# Patient Record
Sex: MALE | Race: WHITE | NOT HISPANIC OR LATINO | URBAN - METROPOLITAN AREA
[De-identification: names, ages, dates, MRNs, and addresses within clinical notes are randomized per-mention and may not be internally consistent; named-entity substitution may affect disease eponyms.]

---

## 2021-04-14 ENCOUNTER — OFFICE VISIT (OUTPATIENT)
Dept: URBAN - METROPOLITAN AREA CLINIC 13 | Facility: CLINIC | Age: 46
End: 2021-04-14

## 2021-04-14 PROBLEM — 35489007 DEPRESSION: Status: ACTIVE | Noted: 2021-04-14

## 2021-04-14 PROBLEM — 69896004 RHEUMATOID ARTHRITIS: Status: ACTIVE | Noted: 2021-04-14

## 2021-04-15 ENCOUNTER — OFFICE VISIT (OUTPATIENT)
Dept: URBAN - METROPOLITAN AREA CLINIC 46 | Facility: CLINIC | Age: 46
End: 2021-04-15

## 2021-04-15 PROBLEM — 38341003 HYPERTENSION: Status: ACTIVE | Noted: 2021-04-15

## 2021-04-16 ENCOUNTER — LAB OUTSIDE AN ENCOUNTER (OUTPATIENT)
Dept: URBAN - METROPOLITAN AREA CLINIC 13 | Facility: CLINIC | Age: 46
End: 2021-04-16

## 2021-04-16 LAB
A/G RATIO: 1.8
ALBUMIN: (no result)
ALKALINE PHOSPHATASE: (no result)
ALT (SGPT): (no result)
AST (SGOT): (no result)
BASO (ABSOLUTE): (no result)
BASOS: (no result)
BILIRUBIN, TOTAL: (no result)
BUN/CREATININE RATIO: 16
BUN: (no result)
C-REACTIVE PROTEIN, QUANT: (no result)
CALCIUM: (no result)
CARBON DIOXIDE, TOTAL: (no result)
CHLORIDE: (no result)
CREATININE: (no result)
EGFR IF AFRICN AM: (no result)
EGFR IF NONAFRICN AM: (no result)
EOS (ABSOLUTE): (no result)
EOS: (no result)
GLOBULIN, TOTAL: (no result)
GLUCOSE: (no result)
HEMATOCRIT: (no result)
HEMOGLOBIN: (no result)
HEP A AB, IGM: NEGATIVE
HEP B CORE AB, TOT: NEGATIVE
HEP B SURFACE AB, QUAL: NON REACTIVE
HEP C VIRUS AB: (no result)
IMMATURE GRANS (ABS): (no result)
IMMATURE GRANULOCYTES: (no result)
LYMPHS (ABSOLUTE): (no result)
LYMPHS: (no result)
MCH: (no result)
MCHC: (no result)
MCV: (no result)
MONOCYTES(ABSOLUTE): (no result)
MONOCYTES: (no result)
NEUTROPHILS (ABSOLUTE): (no result)
NEUTROPHILS: (no result)
PLATELETS: (no result)
POTASSIUM: (no result)
PROTEIN, TOTAL: (no result)
QUANTIFERON CRITERIA: (no result)
QUANTIFERON INCUBATION: (no result)
QUANTIFERON MITOGEN VALUE: (no result)
QUANTIFERON NIL VALUE: (no result)
QUANTIFERON TB1 AG VALUE: (no result)
QUANTIFERON TB2 AG VALUE: (no result)
QUANTIFERON-TB GOLD PLUS: NEGATIVE
RBC: (no result)
RDW: (no result)
SODIUM: (no result)
WBC: (no result)

## 2021-04-17 LAB
CALPROTECTIN, STOOL - QDX: (no result)
GASTROINTESTINAL PATHOGEN: (no result)

## 2021-04-21 ENCOUNTER — OFFICE VISIT (OUTPATIENT)
Dept: URBAN - METROPOLITAN AREA SURGERY CENTER 28 | Facility: SURGERY CENTER | Age: 46
End: 2021-04-21

## 2021-04-21 PROBLEM — 396331005 CELIAC DISEASE: Status: ACTIVE | Noted: 2021-04-21

## 2021-04-21 LAB — PDFREPORT1: (no result)

## 2021-04-22 ENCOUNTER — LAB OUTSIDE AN ENCOUNTER (OUTPATIENT)
Dept: URBAN - METROPOLITAN AREA CLINIC 13 | Facility: CLINIC | Age: 46
End: 2021-04-22

## 2021-04-23 ENCOUNTER — LAB OUTSIDE AN ENCOUNTER (OUTPATIENT)
Dept: URBAN - METROPOLITAN AREA CLINIC 13 | Facility: CLINIC | Age: 46
End: 2021-04-23

## 2021-04-26 ENCOUNTER — OFFICE VISIT (OUTPATIENT)
Dept: URBAN - METROPOLITAN AREA CLINIC 13 | Facility: CLINIC | Age: 46
End: 2021-04-26

## 2021-05-17 ENCOUNTER — OFFICE VISIT (OUTPATIENT)
Dept: URBAN - METROPOLITAN AREA CLINIC 13 | Facility: CLINIC | Age: 46
End: 2021-05-17

## 2021-06-01 ENCOUNTER — OFFICE VISIT (OUTPATIENT)
Dept: URBAN - METROPOLITAN AREA CLINIC 46 | Facility: CLINIC | Age: 46
End: 2021-06-01

## 2021-06-01 PROBLEM — 428283002 HISTORY OF POLYP OF COLON: Status: ACTIVE | Noted: 2021-06-01

## 2021-06-01 PROBLEM — 398050005 DIVERTICULAR DISEASE OF COLON: Status: ACTIVE | Noted: 2021-06-01

## 2021-07-15 ENCOUNTER — OFFICE VISIT (OUTPATIENT)
Dept: URBAN - METROPOLITAN AREA CLINIC 46 | Facility: CLINIC | Age: 46
End: 2021-07-15

## 2021-07-16 ENCOUNTER — OFFICE VISIT (OUTPATIENT)
Dept: URBAN - METROPOLITAN AREA CLINIC 46 | Facility: CLINIC | Age: 46
End: 2021-07-16

## 2021-08-23 ENCOUNTER — OFFICE VISIT (OUTPATIENT)
Dept: URBAN - METROPOLITAN AREA CLINIC 46 | Facility: CLINIC | Age: 46
End: 2021-08-23

## 2021-08-28 ENCOUNTER — TELEPHONE ENCOUNTER (OUTPATIENT)
Dept: URBAN - METROPOLITAN AREA CLINIC 13 | Facility: CLINIC | Age: 46
End: 2021-08-28

## 2021-08-28 RX ORDER — MESALAMINE 1.2 G/1
TABLET, DELAYED RELEASE ORAL
OUTPATIENT
End: 2021-06-01

## 2021-08-28 RX ORDER — PREDNISONE 10 MG/1
TABLET ORAL
OUTPATIENT
End: 2021-05-17

## 2021-08-28 RX ORDER — DICYCLOMINE HYDROCHLORIDE 10 MG/1
CAPSULE ORAL
OUTPATIENT
Start: 2021-06-01 | End: 2021-08-23

## 2021-08-28 RX ORDER — TRAMADOL HYDROCHLORIDE 50 MG/1
TABLET, FILM COATED ORAL
OUTPATIENT
Start: 2021-07-07 | End: 2021-08-23

## 2021-08-28 RX ORDER — PREDNISONE 10 MG/1
TABLET ORAL
OUTPATIENT
Start: 2021-06-01 | End: 2021-08-23

## 2021-08-28 RX ORDER — BUDESONIDE 9 MG/1
TABLET, EXTENDED RELEASE ORAL
OUTPATIENT
Start: 2021-04-15 | End: 2021-05-17

## 2021-08-28 RX ORDER — MESALAMINE 4 G/60ML
ENEMA RECTAL
OUTPATIENT
Start: 2021-06-01 | End: 2021-08-23

## 2021-08-28 RX ORDER — OXYCODONE AND ACETAMINOPHEN 7.5; 325 MG/1; MG/1
TABLET ORAL
OUTPATIENT
Start: 2021-06-01 | End: 2021-08-23

## 2021-08-29 ENCOUNTER — TELEPHONE ENCOUNTER (OUTPATIENT)
Dept: URBAN - METROPOLITAN AREA CLINIC 13 | Facility: CLINIC | Age: 46
End: 2021-08-29

## 2021-08-29 RX ORDER — OXYCODONE AND ACETAMINOPHEN 7.5; 325 MG/1; MG/1
TABLET ORAL
Status: ACTIVE | COMMUNITY
Start: 2021-08-23

## 2021-09-07 ENCOUNTER — TELEPHONE ENCOUNTER (OUTPATIENT)
Dept: URBAN - METROPOLITAN AREA CLINIC 46 | Facility: CLINIC | Age: 46
End: 2021-09-07

## 2021-09-07 RX ORDER — HYOSCYAMINE SULFATE 0.12 MG/1
1 TABLET UNDER THE TONGUE AND ALLOW TO DISSOLVE  AS NEEDED TABLET, ORALLY DISINTEGRATING ORAL
Qty: 120 | Refills: 3 | OUTPATIENT
Start: 2021-09-09 | End: 2022-01-06

## 2021-09-07 RX ORDER — METRONIDAZOLE 250 MG/1
1 TABLET TABLET ORAL TWICE A DAY
Qty: 60 TABLET | Refills: 0 | OUTPATIENT
Start: 2021-09-09 | End: 2021-10-09

## 2021-10-14 ENCOUNTER — OFFICE VISIT (OUTPATIENT)
Dept: URBAN - METROPOLITAN AREA CLINIC 46 | Facility: CLINIC | Age: 46
End: 2021-10-14
Payer: COMMERCIAL

## 2021-10-14 VITALS
HEART RATE: 74 BPM | SYSTOLIC BLOOD PRESSURE: 130 MMHG | BODY MASS INDEX: 26.34 KG/M2 | WEIGHT: 184 LBS | TEMPERATURE: 98.7 F | HEIGHT: 70 IN | DIASTOLIC BLOOD PRESSURE: 79 MMHG

## 2021-10-14 DIAGNOSIS — K51.011 ULCERATIVE PANCOLITIS WITH RECTAL BLEEDING: ICD-10-CM

## 2021-10-14 PROCEDURE — 99214 OFFICE O/P EST MOD 30 MIN: CPT | Performed by: INTERNAL MEDICINE

## 2021-10-14 RX ORDER — DIPHENOXYLATE HYDROCHLORIDE AND ATROPINE SULFATE 2.5; .025 MG/1; MG/1
1 TABLET AS NEEDED TABLET ORAL
Qty: 120 TABLET | Refills: 3 | OUTPATIENT
Start: 2021-10-14 | End: 2022-02-10

## 2021-10-14 RX ORDER — NORTRIPTYLINE HYDROCHLORIDE 25 MG/1
1 CAPSULE CAPSULE ORAL
Qty: 30 | Refills: 3 | OUTPATIENT

## 2021-10-14 RX ORDER — HYOSCYAMINE SULFATE 0.12 MG/1
1 TABLET UNDER THE TONGUE AND ALLOW TO DISSOLVE  AS NEEDED TABLET, ORALLY DISINTEGRATING ORAL
Qty: 120 | Refills: 3 | Status: ACTIVE | COMMUNITY
Start: 2021-09-09 | End: 2022-01-06

## 2021-10-14 RX ORDER — OXYCODONE AND ACETAMINOPHEN 7.5; 325 MG/1; MG/1
TABLET ORAL
Status: ACTIVE | COMMUNITY
Start: 2021-08-23

## 2021-10-14 RX ORDER — OXYCODONE AND ACETAMINOPHEN 7.5; 325 MG/1; MG/1
TABLET ORAL
Start: 2021-08-23

## 2021-10-14 RX ORDER — HYOSCYAMINE SULFATE 0.12 MG/1
1 TABLET UNDER THE TONGUE AND ALLOW TO DISSOLVE  AS NEEDED TABLET, ORALLY DISINTEGRATING ORAL
OUTPATIENT
Start: 2021-09-09 | End: 2022-01-06

## 2021-10-14 NOTE — HPI-TODAY'S VISIT:
Pt dx with left sided Ulcerative Colitis in [place] in 7/2019; failed prednisone and presented 4/2021 for a second opinion. Restaging colonoscopy revealed severe pan colitis with more severe disease in the distal 35cm of the colon. Pt since has started Humira 80mg every 2 weeks in 6/2021 and was able to taper Prednisone on last visit 8/23/2021 down to 2 BM per day but has had severe spasm causing  disability due to symptoms requiring Tramadol, Percocet, Anti-spasmodics. Labs 9/8/2021 with WBC 10; Hgb 10.3 and MCV 72; CMP wnl; CRP 12. Pt has since called the office 9/7/2021 with severe spasm and no relief with Bentyl and Donnatol not availabe and asking for "another opinion"; advised to try Levsin and start Flagyl 250mg BID for 4 weeks. Pt now presents for a follow up. States overall better but not symptom free. 10-12 BM per day and "violent" spasm but has moved from lower abdomen to just the rectum; formed to mucus and small volume; no bleeding. Levsin QID and no relief or difference from Bentyl. Taking 1 percocet twice a day. Flagyl for the past 4 weeks and no relief. Weight stable and no drop. No EIM's of IBD. Able to work.

## 2021-11-30 ENCOUNTER — TELEPHONE ENCOUNTER (OUTPATIENT)
Dept: URBAN - METROPOLITAN AREA CLINIC 46 | Facility: CLINIC | Age: 46
End: 2021-11-30

## 2021-12-09 ENCOUNTER — OFFICE VISIT (OUTPATIENT)
Dept: URBAN - METROPOLITAN AREA CLINIC 46 | Facility: CLINIC | Age: 46
End: 2021-12-09

## 2021-12-30 ENCOUNTER — OFFICE VISIT (OUTPATIENT)
Dept: URBAN - METROPOLITAN AREA CLINIC 46 | Facility: CLINIC | Age: 46
End: 2021-12-30
Payer: COMMERCIAL

## 2021-12-30 VITALS
SYSTOLIC BLOOD PRESSURE: 146 MMHG | DIASTOLIC BLOOD PRESSURE: 88 MMHG | HEIGHT: 70 IN | HEART RATE: 67 BPM | BODY MASS INDEX: 27.32 KG/M2 | TEMPERATURE: 98.7 F | WEIGHT: 190.8 LBS

## 2021-12-30 DIAGNOSIS — K51.011 ULCERATIVE PANCOLITIS WITH RECTAL BLEEDING: ICD-10-CM

## 2021-12-30 PROCEDURE — 99213 OFFICE O/P EST LOW 20 MIN: CPT | Performed by: INTERNAL MEDICINE

## 2021-12-30 RX ORDER — DIPHENOXYLATE HYDROCHLORIDE AND ATROPINE SULFATE 2.5; .025 MG/1; MG/1
1 TABLET AS NEEDED TABLET ORAL
Qty: 120 TABLET | Refills: 3 | Status: ACTIVE | COMMUNITY
Start: 2021-10-14 | End: 2022-02-10

## 2021-12-30 RX ORDER — OXYCODONE AND ACETAMINOPHEN 7.5; 325 MG/1; MG/1
TABLET ORAL
Status: ACTIVE | COMMUNITY
Start: 2021-08-23

## 2021-12-30 RX ORDER — NORTRIPTYLINE HYDROCHLORIDE 25 MG/1
1 CAPSULE CAPSULE ORAL
Qty: 30 | Refills: 3 | Status: DISCONTINUED | COMMUNITY

## 2021-12-30 RX ORDER — OXYCODONE AND ACETAMINOPHEN 7.5; 325 MG/1; MG/1
TABLET ORAL
OUTPATIENT
Start: 2021-08-23

## 2021-12-30 RX ORDER — DIPHENOXYLATE HYDROCHLORIDE AND ATROPINE SULFATE 2.5; .025 MG/1; MG/1
1 TABLET AS NEEDED TABLET ORAL
OUTPATIENT
Start: 2021-10-14

## 2021-12-30 NOTE — HPI-TODAY'S VISIT:
Pt dx with left sided Ulcerative Colitis in [place] in 7/2019; failed prednisone and presented 4/2021 for a second opinion. Restaging colonoscopy revealed severe pan colitis with more severe disease in the distal 35cm of the colon. Pt since has started Humira 80mg every 2 weeks in 6/2021 and was able to taper Prednisone but has had severe spasm causing  disability due to symptoms requiring Tramadol, Percocet, Anti-spasmodics. Labs 9/8/2021 with WBC 10; Hgb 10.3 and MCV 72; CMP wnl; CRP 12.   Seen for a follow up 10/2021 and  better but not symptom free; 10-12 BM per day and still with "violent" spasm but has moved from lower abdomen to just the rectum; formed to mucus and small volume; no bleeding. Levsin QID and no relief or difference from Bentyl; only relief is with 1 percocet twice a day. Lomotil added and Elavil (not filled)  Pt has since called back for refills on Percocet. Now presents for a follow up. States continues to be stable and not getting worse but still with 10 small volume BM per day with nocturnal symptoms; no bleeding. Rectal spasm still can be severe. Taking Lomotil QID and Apriso once a day. Still on Humira 80mg every other week. Has weaned down on Percocet and only taking every 2-3 days. Still with fatigue at times. No joint pain or swelling. No weight loss.

## 2022-01-26 ENCOUNTER — TELEPHONE ENCOUNTER (OUTPATIENT)
Dept: URBAN - METROPOLITAN AREA CLINIC 46 | Facility: CLINIC | Age: 47
End: 2022-01-26

## 2022-01-28 ENCOUNTER — OFFICE VISIT (OUTPATIENT)
Dept: URBAN - METROPOLITAN AREA SURGERY CENTER 28 | Facility: SURGERY CENTER | Age: 47
End: 2022-01-28
Payer: COMMERCIAL

## 2022-01-28 DIAGNOSIS — K64.0 BLEEDING GRADE I HEMORRHOIDS: ICD-10-CM

## 2022-01-28 DIAGNOSIS — K52.89 (LYMPHOCYTIC) MICROSCOPIC COLITIS: ICD-10-CM

## 2022-01-28 DIAGNOSIS — K51.50 CHRONIC LEFT-SIDED ULCERATIVE COLITIS: ICD-10-CM

## 2022-01-28 PROCEDURE — 45380 COLONOSCOPY AND BIOPSY: CPT | Performed by: INTERNAL MEDICINE

## 2022-01-28 PROCEDURE — G8907 PT DOC NO EVENTS ON DISCHARG: HCPCS | Performed by: INTERNAL MEDICINE

## 2022-01-28 RX ORDER — OXYCODONE AND ACETAMINOPHEN 7.5; 325 MG/1; MG/1
TABLET ORAL
Status: ACTIVE | COMMUNITY
Start: 2021-08-23

## 2022-01-28 RX ORDER — DIPHENOXYLATE HYDROCHLORIDE AND ATROPINE SULFATE 2.5; .025 MG/1; MG/1
1 TABLET AS NEEDED TABLET ORAL
Status: ACTIVE | COMMUNITY
Start: 2021-10-14

## 2022-02-21 ENCOUNTER — TELEPHONE ENCOUNTER (OUTPATIENT)
Dept: URBAN - METROPOLITAN AREA CLINIC 46 | Facility: CLINIC | Age: 47
End: 2022-02-21

## 2022-02-21 RX ORDER — USTEKINUMAB 90 MG/ML
AS DIRECTED INJECTION, SOLUTION SUBCUTANEOUS
Qty: 90 MILLIGRAMS | Refills: 0 | OUTPATIENT
Start: 2022-02-25 | End: 2022-05-26

## 2022-02-21 RX ORDER — USTEKINUMAB 130 MG/26ML
AS DIRECTED SOLUTION INTRAVENOUS ONCE
Qty: 390 MILLIGRAMS | Refills: 0 | OUTPATIENT
Start: 2022-02-25 | End: 2022-02-26

## 2022-03-07 ENCOUNTER — OFFICE VISIT (OUTPATIENT)
Dept: URBAN - METROPOLITAN AREA CLINIC 48 | Facility: CLINIC | Age: 47
End: 2022-03-07

## 2022-03-11 ENCOUNTER — WEB ENCOUNTER (OUTPATIENT)
Dept: URBAN - METROPOLITAN AREA CLINIC 46 | Facility: CLINIC | Age: 47
End: 2022-03-11

## 2022-03-29 ENCOUNTER — OFFICE VISIT (OUTPATIENT)
Dept: URBAN - METROPOLITAN AREA CLINIC 43 | Facility: CLINIC | Age: 47
End: 2022-03-29
Payer: COMMERCIAL

## 2022-03-29 ENCOUNTER — TELEPHONE ENCOUNTER (OUTPATIENT)
Dept: URBAN - METROPOLITAN AREA CLINIC 43 | Facility: CLINIC | Age: 47
End: 2022-03-29

## 2022-03-29 VITALS
SYSTOLIC BLOOD PRESSURE: 136 MMHG | DIASTOLIC BLOOD PRESSURE: 94 MMHG | TEMPERATURE: 97 F | RESPIRATION RATE: 18 BRPM | WEIGHT: 197.6 LBS | HEIGHT: 70 IN | HEART RATE: 75 BPM | BODY MASS INDEX: 28.29 KG/M2

## 2022-03-29 DIAGNOSIS — K51.80 CHRONIC PANCOLONIC ULCERATIVE COLITIS: ICD-10-CM

## 2022-03-29 PROCEDURE — 96413 CHEMO IV INFUSION 1 HR: CPT | Performed by: INTERNAL MEDICINE

## 2022-03-29 RX ORDER — USTEKINUMAB 90 MG/ML
AS DIRECTED INJECTION, SOLUTION SUBCUTANEOUS
Qty: 90 MILLIGRAMS | Refills: 0 | Status: ACTIVE | COMMUNITY
Start: 2022-02-25 | End: 2022-05-26

## 2022-03-29 RX ORDER — DIPHENOXYLATE HYDROCHLORIDE AND ATROPINE SULFATE 2.5; .025 MG/1; MG/1
1 TABLET AS NEEDED TABLET ORAL
Status: ACTIVE | COMMUNITY
Start: 2021-10-14

## 2022-03-29 RX ORDER — OXYCODONE AND ACETAMINOPHEN 7.5; 325 MG/1; MG/1
TABLET ORAL
Status: ACTIVE | COMMUNITY
Start: 2021-08-23

## 2022-04-04 ENCOUNTER — WEB ENCOUNTER (OUTPATIENT)
Dept: URBAN - METROPOLITAN AREA CLINIC 46 | Facility: CLINIC | Age: 47
End: 2022-04-04

## 2022-04-29 ENCOUNTER — TELEPHONE ENCOUNTER (OUTPATIENT)
Dept: URBAN - METROPOLITAN AREA CLINIC 46 | Facility: CLINIC | Age: 47
End: 2022-04-29

## 2022-08-12 ENCOUNTER — TELEPHONE ENCOUNTER (OUTPATIENT)
Dept: URBAN - METROPOLITAN AREA CLINIC 46 | Facility: CLINIC | Age: 47
End: 2022-08-12

## 2022-10-11 ENCOUNTER — ERX REFILL RESPONSE (OUTPATIENT)
Dept: URBAN - METROPOLITAN AREA CLINIC 44 | Facility: CLINIC | Age: 47
End: 2022-10-11

## 2022-10-11 RX ORDER — MESALAMINE 0.38 G/1
TAKE 4 CAPSULES ONCE A DAY AS DIRECTED CAPSULE, EXTENDED RELEASE ORAL
Qty: 360 CAPSULE | Refills: 3 | OUTPATIENT

## 2022-11-17 ENCOUNTER — TELEPHONE ENCOUNTER (OUTPATIENT)
Dept: URBAN - METROPOLITAN AREA CLINIC 46 | Facility: CLINIC | Age: 47
End: 2022-11-17

## 2022-12-27 ENCOUNTER — OFFICE VISIT (OUTPATIENT)
Dept: URBAN - METROPOLITAN AREA CLINIC 44 | Facility: CLINIC | Age: 47
End: 2022-12-27
Payer: COMMERCIAL

## 2022-12-27 VITALS
WEIGHT: 218.6 LBS | SYSTOLIC BLOOD PRESSURE: 139 MMHG | HEART RATE: 80 BPM | BODY MASS INDEX: 31.3 KG/M2 | HEIGHT: 70 IN | DIASTOLIC BLOOD PRESSURE: 97 MMHG | TEMPERATURE: 99.7 F

## 2022-12-27 DIAGNOSIS — K51.90 ULCERATIVE COLITIS: ICD-10-CM

## 2022-12-27 PROCEDURE — 99214 OFFICE O/P EST MOD 30 MIN: CPT | Performed by: INTERNAL MEDICINE

## 2022-12-27 RX ORDER — USTEKINUMAB 90 MG/ML
INJECTION, SOLUTION SUBCUTANEOUS
Qty: 1 | Refills: 3 | Status: ACTIVE | COMMUNITY

## 2022-12-27 RX ORDER — MESALAMINE 0.38 G/1
TAKE 4 CAPSULES ONCE A DAY AS DIRECTED CAPSULE, EXTENDED RELEASE ORAL
OUTPATIENT

## 2022-12-27 RX ORDER — MESALAMINE 0.38 G/1
TAKE 4 CAPSULES ONCE A DAY AS DIRECTED CAPSULE, EXTENDED RELEASE ORAL
Qty: 360 CAPSULE | Refills: 3 | Status: ACTIVE | COMMUNITY

## 2022-12-27 RX ORDER — USTEKINUMAB 90 MG/ML
INJECTION, SOLUTION SUBCUTANEOUS
OUTPATIENT

## 2022-12-27 NOTE — HPI-TODAY'S VISIT:
Pt dx with left sided Ulcerative Colitis in [place] in 7/2019; failed prednisone and presented 4/2021 for a second opinion. Restaging colonoscopy revealed severe pan colitis with more severe disease in the distal 35cm of the colon. Pt since has started Humira 80mg every 2 weeks in 6/2021 and was able to taper Prednisone but has had severe spasm causing  disability due to symptoms requiring Tramadol, Percocet, Anti-spasmodics. Labs 9/8/2021 with WBC 10; Hgb 10.3 and MCV 72; CMP wnl; CRP 12.   Seen for a follow up 10/2021 and  better but not symptom free; 10-12 BM per day and still with "violent" spasm but has moved from lower abdomen to just the rectum; formed to mucus and small volume; no bleeding. Levsin QID and no relief or difference from Bentyl; only relief is with 1 percocet twice a day. Lomotil added and Elavil (not filled)  12/2021:. Now presents for a follow up. States continues to be stable and not getting worse but still with 10 small volume BM per day with nocturnal symptoms; no bleeding. Rectal spasm still can be severe. Taking Lomotil QID and Apriso once a day. Still on Humira 80mg every other week. Has weaned down on Percocet and only taking every 2-3 days. Still with fatigue at times. No joint pain or swelling. No weight loss.  1/28/2022; restaging colonoscopy with severe disease in distal 25cm of colon. Stelara induction started 3/2022 and on 90mg every 8 weeks since.   12/27/2022: Now for a follow up. Last 90 Percocet Rx 9/29/2022. States feels better then has in years on the Stelara. Also taking Apriso 3-4 Clustered BM in AM that are formed then 1-2 rest of day. Last time saw blood was 10/2022. Not needed pain meds since 10/2022. No EIMS of IBD. No recent labs

## 2023-02-27 ENCOUNTER — WEB ENCOUNTER (OUTPATIENT)
Dept: URBAN - METROPOLITAN AREA CLINIC 46 | Facility: CLINIC | Age: 48
End: 2023-02-27

## 2023-04-07 ENCOUNTER — TELEPHONE ENCOUNTER (OUTPATIENT)
Dept: URBAN - METROPOLITAN AREA CLINIC 46 | Facility: CLINIC | Age: 48
End: 2023-04-07

## 2023-04-07 RX ORDER — USTEKINUMAB 90 MG/ML
AS DIRECTED INJECTION, SOLUTION SUBCUTANEOUS
Qty: 1 KIT | Refills: 5

## 2023-05-15 ENCOUNTER — TELEPHONE ENCOUNTER (OUTPATIENT)
Dept: URBAN - METROPOLITAN AREA CLINIC 46 | Facility: CLINIC | Age: 48
End: 2023-05-15

## 2023-05-15 RX ORDER — OXYCODONE AND ACETAMINOPHEN 7.5; 325 MG/1; MG/1
TABLET ORAL
Start: 2021-08-23

## 2023-05-26 ENCOUNTER — OFFICE VISIT (OUTPATIENT)
Dept: URBAN - METROPOLITAN AREA CLINIC 46 | Facility: CLINIC | Age: 48
End: 2023-05-26
Payer: COMMERCIAL

## 2023-05-26 VITALS
BODY MASS INDEX: 30.92 KG/M2 | HEIGHT: 70 IN | WEIGHT: 216 LBS | SYSTOLIC BLOOD PRESSURE: 161 MMHG | HEART RATE: 67 BPM | TEMPERATURE: 98.8 F | DIASTOLIC BLOOD PRESSURE: 93 MMHG

## 2023-05-26 DIAGNOSIS — K51.011 ULCERATIVE PANCOLITIS WITH RECTAL BLEEDING: ICD-10-CM

## 2023-05-26 PROCEDURE — 99214 OFFICE O/P EST MOD 30 MIN: CPT | Performed by: INTERNAL MEDICINE

## 2023-05-26 RX ORDER — OXYCODONE AND ACETAMINOPHEN 7.5; 325 MG/1; MG/1: TABLET ORAL

## 2023-05-26 RX ORDER — USTEKINUMAB 90 MG/ML
AS DIRECTED INJECTION, SOLUTION SUBCUTANEOUS
Qty: 1 KIT | Refills: 5 | Status: ACTIVE | COMMUNITY

## 2023-05-26 RX ORDER — TADALAFIL 5 MG/1
1 TABLET AS NEEDED TABLET ORAL ONCE A DAY
Qty: 90 TABLET | Status: ACTIVE | COMMUNITY

## 2023-05-26 RX ORDER — USTEKINUMAB 90 MG/ML
AS DIRECTED INJECTION, SOLUTION SUBCUTANEOUS
OUTPATIENT

## 2023-05-26 RX ORDER — BUDESONIDE 9 MG/1
1 TABLET IN THE MORNING TABLET, FILM COATED, EXTENDED RELEASE ORAL ONCE A DAY
Qty: 30 TABLET | Refills: 1 | OUTPATIENT
Start: 2023-05-26 | End: 2023-07-25

## 2023-05-26 NOTE — HPI-TODAY'S VISIT:
Pt dx with left sided Ulcerative Colitis in [place] in 7/2019; failed prednisone and presented 4/2021 for a second opinion. Restaging colonoscopy revealed severe pan colitis with more severe disease in the distal 35cm of the colon. Pt since has started Humira 80mg every 2 weeks in 6/2021 and was able to taper Prednisone but has had severe spasm causing  disability due to symptoms requiring Tramadol, Percocet, Anti-spasmodics. Labs 9/8/2021 with WBC 10; Hgb 10.3 and MCV 72; CMP wnl; CRP 12.   Seen for a follow up 10/2021 and  better but not symptom free; 10-12 BM per day and still with "violent" spasm but has moved from lower abdomen to just the rectum; formed to mucus and small volume; no bleeding. Levsin QID and no relief or difference from Bentyl; only relief is with 1 percocet twice a day. Lomotil added and Elavil (not filled)  12/2021:. Now presents for a follow up. States continues to be stable and not getting worse but still with 10 small volume BM per day with nocturnal symptoms; no bleeding. Rectal spasm still can be severe. Taking Lomotil QID and Apriso once a day. Still on Humira 80mg every other week. Has weaned down on Percocet and only taking every 2-3 days. Still with fatigue at times. No joint pain or swelling. No weight loss.  1/28/2022; restaging colonoscopy with severe disease in distal 25cm of colon. Stelara induction started 3/2022 and on 90mg every 8 weeks since.   12/27/2022: Now for a follow up. Last 90 Percocet Rx 9/29/2022. States feels better then has in years on the Stelara. Also taking Apriso 3-4 Clustered BM in AM that are formed then 1-2 rest of day. Last time saw blood was 10/2022. Not needed pain meds since 10/2022. No EIMS of IBD. No recent labs.  Plan was to continue Stelara 90mg every 8 weeks. CBC, CMP, CRP 1/2023 checked and all wnl, QTG negative   5/25/2023: For a follow up. Was doing well until 3/2023 and has been in mild flare: 4-6 BM with ugency and bleeding. Mild faitgue. Feels like stable and not worsening in the past 2 weeks. Still on Stelara 90mg every 8 weeks and last dose 4 weeks ago.

## 2023-05-31 ENCOUNTER — LAB OUTSIDE AN ENCOUNTER (OUTPATIENT)
Dept: URBAN - METROPOLITAN AREA CLINIC 44 | Facility: CLINIC | Age: 48
End: 2023-05-31

## 2023-06-07 ENCOUNTER — WEB ENCOUNTER (OUTPATIENT)
Dept: URBAN - METROPOLITAN AREA CLINIC 44 | Facility: CLINIC | Age: 48
End: 2023-06-07

## 2023-06-07 LAB
ADENOVIRUS F 40/41: NOT DETECTED
CALPROTECTIN, STOOL - QDX: (no result)
CAMPYLOBACTER: NOT DETECTED
CLOSTRIDIUM DIFFICILE: NOT DETECTED
CRYPTOSPORIDIUM: NOT DETECTED
ENTAMOEBA HISTOLYTICA: NOT DETECTED
ENTEROAGGREGATIVE E.COLI: NOT DETECTED
ENTEROTOXIGENIC E.COLI: NOT DETECTED
ESCHERICHIA COLI O157: NOT DETECTED
GIARDIA LAMBLIA: NOT DETECTED
NOROVIRUS GI/GII: NOT DETECTED
ROTAVIRUS A: NOT DETECTED
SALMONELLA SPP.: NOT DETECTED
SHIGA-LIKE TOXIN PRODUCING E.COLI: NOT DETECTED
SHIGELLA SPP. / ENTEROINVASIVE E.COLI: NOT DETECTED
VIBRIO PARAHAEMOLYTICUS: NOT DETECTED
VIBRIO SPP.: NOT DETECTED
YERSINIA ENTEROCOLITICA: NOT DETECTED

## 2023-06-22 ENCOUNTER — ERX REFILL RESPONSE (OUTPATIENT)
Dept: URBAN - METROPOLITAN AREA CLINIC 46 | Facility: CLINIC | Age: 48
End: 2023-06-22

## 2023-06-22 RX ORDER — BUDESONIDE 9 MG/1
TAKE 1 TABLET BY MOUTH EVERY DAY IN THE MORNING FOR 30 DAYS TABLET, FILM COATED, EXTENDED RELEASE ORAL
Qty: 30 TABLET | Refills: 1 | OUTPATIENT

## 2023-06-22 RX ORDER — BUDESONIDE 9 MG/1
1 TABLET IN THE MORNING TABLET, FILM COATED, EXTENDED RELEASE ORAL ONCE A DAY
Qty: 30 TABLET | Refills: 1 | OUTPATIENT

## 2023-06-23 ENCOUNTER — OFFICE VISIT (OUTPATIENT)
Dept: URBAN - METROPOLITAN AREA CLINIC 46 | Facility: CLINIC | Age: 48
End: 2023-06-23

## 2023-06-26 ENCOUNTER — TELEPHONE ENCOUNTER (OUTPATIENT)
Dept: URBAN - METROPOLITAN AREA CLINIC 46 | Facility: CLINIC | Age: 48
End: 2023-06-26

## 2023-06-26 RX ORDER — USTEKINUMAB 90 MG/ML
AS DIRECTED INJECTION, SOLUTION SUBCUTANEOUS
Status: ACTIVE | COMMUNITY

## 2023-06-26 RX ORDER — BUDESONIDE 9 MG/1
TAKE 1 TABLET BY MOUTH EVERY DAY IN THE MORNING FOR 30 DAYS TABLET, FILM COATED, EXTENDED RELEASE ORAL
Qty: 30 TABLET | Refills: 1 | Status: ACTIVE | COMMUNITY

## 2023-06-26 RX ORDER — BUDESONIDE 9 MG/1
TAKE 1 TABLET BY MOUTH EVERY DAY IN THE MORNING FOR 30 DAYS TABLET, FILM COATED, EXTENDED RELEASE ORAL
OUTPATIENT

## 2023-06-26 RX ORDER — OXYCODONE HYDROCHLORIDE AND ACETAMINOPHEN 7.5; 325 MG/1; MG/1
1 TABLET AS NEEDED TABLET ORAL
Qty: 60 TABLET | Refills: 0 | OUTPATIENT
Start: 2023-06-26 | End: 2023-07-26

## 2023-06-26 RX ORDER — TADALAFIL 5 MG/1
1 TABLET AS NEEDED TABLET ORAL ONCE A DAY
Qty: 90 TABLET | Status: ACTIVE | COMMUNITY

## 2023-06-26 RX ORDER — OXYCODONE AND ACETAMINOPHEN 7.5; 325 MG/1; MG/1
TABLET ORAL
Status: ACTIVE | COMMUNITY

## 2023-06-26 RX ORDER — PREDNISONE 20 MG/1
1 TABLET TABLET ORAL ONCE A DAY
Qty: 30 | Refills: 3 | OUTPATIENT
Start: 2023-06-26 | End: 2023-10-24

## 2023-06-30 ENCOUNTER — TELEPHONE ENCOUNTER (OUTPATIENT)
Dept: URBAN - METROPOLITAN AREA CLINIC 46 | Facility: CLINIC | Age: 48
End: 2023-06-30

## 2023-06-30 RX ORDER — OXYCODONE HYDROCHLORIDE AND ACETAMINOPHEN 7.5; 325 MG/1; MG/1
1 TABLET AS NEEDED TABLET ORAL
Qty: 60 TABLET | Refills: 0 | COMMUNITY
Start: 2023-06-26 | End: 2023-07-26

## 2023-06-30 RX ORDER — PREDNISONE 20 MG/1
1 TABLET TABLET ORAL ONCE A DAY
Qty: 30 | Refills: 3
Start: 2023-06-26 | End: 2023-10-28

## 2023-06-30 RX ORDER — USTEKINUMAB 90 MG/ML
AS DIRECTED INJECTION, SOLUTION SUBCUTANEOUS
COMMUNITY

## 2023-06-30 RX ORDER — TADALAFIL 5 MG/1
1 TABLET AS NEEDED TABLET ORAL ONCE A DAY
Qty: 90 TABLET | COMMUNITY

## 2023-06-30 RX ORDER — PREDNISONE 20 MG/1
1 TABLET TABLET ORAL ONCE A DAY
Qty: 30 | Refills: 3 | COMMUNITY
Start: 2023-06-26 | End: 2023-10-24

## 2023-06-30 RX ORDER — OXYCODONE AND ACETAMINOPHEN 7.5; 325 MG/1; MG/1
TABLET ORAL
COMMUNITY

## 2023-07-18 ENCOUNTER — TELEPHONE ENCOUNTER (OUTPATIENT)
Dept: URBAN - METROPOLITAN AREA CLINIC 46 | Facility: CLINIC | Age: 48
End: 2023-07-18

## 2023-07-20 ENCOUNTER — TELEPHONE ENCOUNTER (OUTPATIENT)
Dept: URBAN - METROPOLITAN AREA CLINIC 46 | Facility: CLINIC | Age: 48
End: 2023-07-20

## 2023-08-04 ENCOUNTER — OFFICE VISIT (OUTPATIENT)
Dept: URBAN - METROPOLITAN AREA CLINIC 46 | Facility: CLINIC | Age: 48
End: 2023-08-04

## 2023-08-07 ENCOUNTER — TELEPHONE ENCOUNTER (OUTPATIENT)
Dept: URBAN - METROPOLITAN AREA CLINIC 44 | Facility: CLINIC | Age: 48
End: 2023-08-07

## 2023-08-07 ENCOUNTER — OFFICE VISIT (OUTPATIENT)
Dept: URBAN - METROPOLITAN AREA SURGERY CENTER 27 | Facility: SURGERY CENTER | Age: 48
End: 2023-08-07
Payer: COMMERCIAL

## 2023-08-07 ENCOUNTER — CLAIMS CREATED FROM THE CLAIM WINDOW (OUTPATIENT)
Dept: URBAN - METROPOLITAN AREA CLINIC 4 | Facility: CLINIC | Age: 48
End: 2023-08-07
Payer: COMMERCIAL

## 2023-08-07 DIAGNOSIS — K51.00 ULCERATIVE CHRONIC PANCOLITIS: ICD-10-CM

## 2023-08-07 DIAGNOSIS — K51.80 CHRONIC PANCOLONIC ULCERATIVE COLITIS: ICD-10-CM

## 2023-08-07 DIAGNOSIS — K64.1 INTERNAL HEMORRHOIDS, GRADE II: ICD-10-CM

## 2023-08-07 DIAGNOSIS — K63.89 OTHER SPECIFIED DISEASES OF INTESTINE: ICD-10-CM

## 2023-08-07 DIAGNOSIS — K63.89 APPENDICITIS EPIPLOICA: ICD-10-CM

## 2023-08-07 DIAGNOSIS — K57.30 DIVERTICULA OF COLON: ICD-10-CM

## 2023-08-07 DIAGNOSIS — K51.919 ULCERATIVE COLITIS, UNSPECIFIED WITH UNSPECIFIED COMPLICATIONS: ICD-10-CM

## 2023-08-07 PROCEDURE — 45380 COLONOSCOPY AND BIOPSY: CPT | Performed by: INTERNAL MEDICINE

## 2023-08-07 PROCEDURE — 88305 TISSUE EXAM BY PATHOLOGIST: CPT | Performed by: PATHOLOGY

## 2023-08-07 PROCEDURE — 88342 IMHCHEM/IMCYTCHM 1ST ANTB: CPT | Performed by: PATHOLOGY

## 2023-08-07 PROCEDURE — G8907 PT DOC NO EVENTS ON DISCHARG: HCPCS | Performed by: INTERNAL MEDICINE

## 2023-08-07 PROCEDURE — 00811 ANES LWR INTST NDSC NOS: CPT | Performed by: ANESTHESIOLOGY

## 2023-08-07 RX ORDER — TADALAFIL 5 MG/1
1 TABLET AS NEEDED TABLET ORAL ONCE A DAY
Qty: 90 TABLET | COMMUNITY

## 2023-08-07 RX ORDER — UPADACITINIB 30 MG/1
1 TABLET TABLET, EXTENDED RELEASE ORAL ONCE A DAY
Qty: 30 | Refills: 5 | OUTPATIENT
Start: 2023-08-07 | End: 2024-02-02

## 2023-08-07 RX ORDER — UPADACITINIB 45 MG/1
1 TABLET TABLET, EXTENDED RELEASE ORAL ONCE DAILY
Qty: 28 | Refills: 1 | OUTPATIENT
Start: 2023-08-07 | End: 2023-10-02

## 2023-08-07 RX ORDER — USTEKINUMAB 90 MG/ML
AS DIRECTED INJECTION, SOLUTION SUBCUTANEOUS
COMMUNITY

## 2023-08-07 RX ORDER — PREDNISONE 20 MG/1
1 TABLET TABLET ORAL ONCE A DAY
Qty: 30 | Refills: 3 | Status: ACTIVE | COMMUNITY
Start: 2023-06-26 | End: 2023-10-28

## 2023-08-07 RX ORDER — OXYCODONE AND ACETAMINOPHEN 7.5; 325 MG/1; MG/1
TABLET ORAL
COMMUNITY

## 2023-08-11 ENCOUNTER — TELEPHONE ENCOUNTER (OUTPATIENT)
Dept: URBAN - METROPOLITAN AREA CLINIC 46 | Facility: CLINIC | Age: 48
End: 2023-08-11

## 2023-09-26 ENCOUNTER — TELEPHONE ENCOUNTER (OUTPATIENT)
Dept: URBAN - METROPOLITAN AREA CLINIC 46 | Facility: CLINIC | Age: 48
End: 2023-09-26

## 2023-09-26 RX ORDER — UPADACITINIB 30 MG/1
1 TABLET TABLET, EXTENDED RELEASE ORAL ONCE A DAY
Qty: 30 | Refills: 5
Start: 2023-08-07 | End: 2024-03-24

## 2023-10-11 ENCOUNTER — OFFICE VISIT (OUTPATIENT)
Dept: URBAN - NONMETROPOLITAN AREA CLINIC 9 | Facility: CLINIC | Age: 48
End: 2023-10-11
Payer: COMMERCIAL

## 2023-10-11 VITALS
HEART RATE: 85 BPM | SYSTOLIC BLOOD PRESSURE: 132 MMHG | WEIGHT: 219.4 LBS | BODY MASS INDEX: 31.41 KG/M2 | HEIGHT: 70 IN | OXYGEN SATURATION: 97 % | DIASTOLIC BLOOD PRESSURE: 89 MMHG

## 2023-10-11 DIAGNOSIS — K51.011 ULCERATIVE PANCOLITIS WITH RECTAL BLEEDING: ICD-10-CM

## 2023-10-11 PROBLEM — 444548001: Status: ACTIVE | Noted: 2021-10-14

## 2023-10-11 PROBLEM — 64766004 ULCERATIVE COLITIS: Status: ACTIVE | Noted: 2022-03-01

## 2023-10-11 PROCEDURE — 99214 OFFICE O/P EST MOD 30 MIN: CPT | Performed by: INTERNAL MEDICINE

## 2023-10-11 RX ORDER — UPADACITINIB 30 MG/1
1 TABLET TABLET, EXTENDED RELEASE ORAL ONCE A DAY
Qty: 30 | Refills: 5 | Status: ACTIVE | COMMUNITY
Start: 2023-08-07 | End: 2024-03-24

## 2023-10-11 RX ORDER — UPADACITINIB 30 MG/1
1 TABLET TABLET, EXTENDED RELEASE ORAL ONCE A DAY
OUTPATIENT
Start: 2023-08-07

## 2023-10-11 RX ORDER — OXYCODONE HYDROCHLORIDE AND ACETAMINOPHEN 7.5; 325 MG/1; MG/1
1 TABLET AS NEEDED TABLET ORAL
Qty: 40 TABLET | Refills: 0

## 2023-10-11 NOTE — HPI-TODAY'S VISIT:
Pt dx with left sided Ulcerative Colitis in [place] in 7/2019; failed prednisone and presented 4/2021 for a second opinion. Restaging colonoscopy revealed severe pan colitis with more severe disease in the distal 35cm of the colon. Pt since has started Humira 80mg every 2 weeks in 6/2021 and was able to taper Prednisone but has had severe spasm causing  disability due to symptoms requiring Tramadol, Percocet, Anti-spasmodics. Labs 9/8/2021 with WBC 10; Hgb 10.3 and MCV 72; CMP wnl; CRP 12.   Seen for a follow up 10/2021 and  better but not symptom free; 10-12 BM per day and still with "violent" spasm but has moved from lower abdomen to just the rectum; formed to mucus and small volume; no bleeding. Levsin QID and no relief or difference from Bentyl; only relief is with 1 percocet twice a day. Lomotil added and Elavil (not filled)  12/2021:. Now presents for a follow up. States continues to be stable and not getting worse but still with 10 small volume BM per day with nocturnal symptoms; no bleeding. Rectal spasm still can be severe. Taking Lomotil QID and Apriso once a day. Still on Humira 80mg every other week. Has weaned down on Percocet and only taking every 2-3 days. Still with fatigue at times. No joint pain or swelling. No weight loss.  1/28/2022; restaging colonoscopy with severe disease in distal 25cm of colon. Stelara induction started 3/2022 and on 90mg every 8 weeks since.   12/27/2022: Now for a follow up. Last 90 Percocet Rx 9/29/2022. States feels better then has in years on the Stelara. Also taking Apriso 3-4 Clustered BM in AM that are formed then 1-2 rest of day. Last time saw blood was 10/2022. Not needed pain meds since 10/2022. No EIMS of IBD. No recent labs.  Plan was to continue Stelara 90mg every 8 weeks. CBC, CMP, CRP 1/2023 checked and all wnl, QTG negative   5/25/2023: For a follow up. Was doing well until 3/2023 and has been in mild flare: 4-6 BM with ugency and bleeding. Mild faitgue. Feels like stable and not worsening in the past 2 weeks. Still on Stelara 90mg every 8 weeks and last dose 4 weeks ago......plan was to start Budesonide and check fecal calpro  Calpro was 600.....no response to Budesonide and started prednisone 20mg for 4 weeks.   Colonoscopy 8/2023 with active diseae in left colon to sigmoid despite 20mg prednisone for 4 weeks; PA for  Rinvoq; prednisone and not refilled  10/11/2023: Pt for a follow up Took 60 days of 45mg of Rinvoq and now on 30mg for the past 1-2 weeks. BM down to 1-2 per day and formed. No more bleeding; No EIM's. Has mild acne which he was told was from Rinvoq. Feels well.

## 2023-11-22 ENCOUNTER — TELEPHONE ENCOUNTER (OUTPATIENT)
Dept: URBAN - NONMETROPOLITAN AREA CLINIC 9 | Facility: CLINIC | Age: 48
End: 2023-11-22

## 2023-11-27 ENCOUNTER — TELEPHONE ENCOUNTER (OUTPATIENT)
Dept: URBAN - METROPOLITAN AREA CLINIC 23 | Facility: CLINIC | Age: 48
End: 2023-11-27

## 2024-02-01 ENCOUNTER — OV EP (OUTPATIENT)
Dept: URBAN - METROPOLITAN AREA CLINIC 46 | Facility: CLINIC | Age: 49
End: 2024-02-01

## 2024-03-04 ENCOUNTER — OV EP (OUTPATIENT)
Dept: URBAN - METROPOLITAN AREA CLINIC 48 | Facility: CLINIC | Age: 49
End: 2024-03-04
Payer: COMMERCIAL

## 2024-03-04 VITALS
OXYGEN SATURATION: 97 % | WEIGHT: 224 LBS | SYSTOLIC BLOOD PRESSURE: 145 MMHG | DIASTOLIC BLOOD PRESSURE: 95 MMHG | HEIGHT: 70 IN | HEART RATE: 81 BPM | BODY MASS INDEX: 32.07 KG/M2 | TEMPERATURE: 99.1 F

## 2024-03-04 DIAGNOSIS — E78.5 HYPERLIPIDEMIA: ICD-10-CM

## 2024-03-04 DIAGNOSIS — K51.90 ULCERATIVE COLITIS: ICD-10-CM

## 2024-03-04 PROBLEM — 55822004: Status: ACTIVE | Noted: 2024-03-04

## 2024-03-04 PROCEDURE — 99214 OFFICE O/P EST MOD 30 MIN: CPT | Performed by: INTERNAL MEDICINE

## 2024-03-04 RX ORDER — UPADACITINIB 15 MG/1
1 TABLET TABLET, EXTENDED RELEASE ORAL ONCE A DAY
Qty: 30 | Refills: 6
Start: 2023-08-07 | End: 2024-09-30

## 2024-03-04 RX ORDER — DICYCLOMINE HYDROCHLORIDE 20 MG/1
1 TABLET TABLET ORAL
Qty: 30 | Refills: 6 | OUTPATIENT
Start: 2024-03-04 | End: 2024-09-30

## 2024-03-04 RX ORDER — UPADACITINIB 30 MG/1
1 TABLET TABLET, EXTENDED RELEASE ORAL ONCE A DAY
Status: ACTIVE | COMMUNITY
Start: 2023-08-07

## 2024-03-04 RX ORDER — OXYCODONE HYDROCHLORIDE AND ACETAMINOPHEN 7.5; 325 MG/1; MG/1
1 TABLET AS NEEDED TABLET ORAL
Qty: 40 TABLET | Refills: 0 | Status: ACTIVE | COMMUNITY

## 2024-03-04 NOTE — HPI-TODAY'S VISIT:
Pt dx with left sided Ulcerative Colitis in [place] in 7/2019; failed prednisone and presented 4/2021 for a second opinion. Restaging colonoscopy revealed severe pan colitis with more severe disease in the distal 35cm of the colon. Pt since has started Humira 80mg every 2 weeks in 6/2021 and was able to taper Prednisone but has had severe spasm causing  disability due to symptoms requiring Tramadol, Percocet, Anti-spasmodics. Labs 9/8/2021 with WBC 10; Hgb 10.3 and MCV 72; CMP wnl; CRP 12.   Seen for a follow up 10/2021 and  better but not symptom free; 10-12 BM per day and still with "violent" spasm but has moved from lower abdomen to just the rectum; formed to mucus and small volume; no bleeding. Levsin QID and no relief or difference from Bentyl; only relief is with 1 percocet twice a day. Lomotil added and Elavil (not filled)  12/2021:. Now presents for a follow up. States continues to be stable and not getting worse but still with 10 small volume BM per day with nocturnal symptoms; no bleeding. Rectal spasm still can be severe. Taking Lomotil QID and Apriso once a day. Still on Humira 80mg every other week. Has weaned down on Percocet and only taking every 2-3 days. Still with fatigue at times. No joint pain or swelling. No weight loss.  1/28/2022; restaging colonoscopy with severe disease in distal 25cm of colon. Stelara induction started 3/2022 and on 90mg every 8 weeks since.   12/27/2022: Now for a follow up. Last 90 Percocet Rx 9/29/2022. States feels better then has in years on the Stelara. Also taking Apriso 3-4 Clustered BM in AM that are formed then 1-2 rest of day. Last time saw blood was 10/2022. Not needed pain meds since 10/2022. No EIMS of IBD. No recent labs.  Plan was to continue Stelara 90mg every 8 weeks. CBC, CMP, CRP 1/2023 checked and all wnl, QTG negative   5/25/2023: For a follow up. Was doing well until 3/2023 and has been in mild flare: 4-6 BM with ugency and bleeding. Mild faitgue. Feels like stable and not worsening in the past 2 weeks. Still on Stelara 90mg every 8 weeks and last dose 4 weeks ago......plan was to start Budesonide and check fecal calpro  Calpro was 600.....no response to Budesonide and started prednisone 20mg for 4 weeks.   Colonoscopy 8/2023 with active diseae in left colon to sigmoid despite 20mg prednisone for 4 weeks; PA for  Rinvoq; prednisone and not refilled  10/11/2023: Pt for a follow up Took 60 days of 45mg of Rinvoq and now on 30mg for the past 1-2 weeks. BM down to 1-2 per day and formed. No more bleeding; No EIM's. Has mild acne which he was told was from Rinvoq. Feels well.  3/4/24 for follow up: Labs 1/17/24 with unremarkable CBC and CMP. Lipid panel with elevated  and ; TG normal at 88 and HDL normal at 65. I have no prior lipid panel to compare with. These labs were ordered at  in October. He has continued on the Rinvoq 30 mg daily. He feels well with 2-3 formed stools daily;no  blood in the stool; reports mild crampy lower abdominal pain in the AM when he wakes up. He states in January he has improved on his diet and has exercised more. He states cholesterol has fluctuated in the past.

## 2024-06-06 ENCOUNTER — OFFICE VISIT (OUTPATIENT)
Dept: URBAN - METROPOLITAN AREA CLINIC 46 | Facility: CLINIC | Age: 49
End: 2024-06-06

## 2024-07-26 ENCOUNTER — OFFICE VISIT (OUTPATIENT)
Dept: URBAN - METROPOLITAN AREA CLINIC 46 | Facility: CLINIC | Age: 49
End: 2024-07-26

## 2024-08-22 ENCOUNTER — OFFICE VISIT (OUTPATIENT)
Dept: URBAN - METROPOLITAN AREA CLINIC 48 | Facility: CLINIC | Age: 49
End: 2024-08-22